# Patient Record
Sex: FEMALE | Race: ASIAN | NOT HISPANIC OR LATINO | Employment: OTHER
[De-identification: names, ages, dates, MRNs, and addresses within clinical notes are randomized per-mention and may not be internally consistent; named-entity substitution may affect disease eponyms.]

---

## 2019-09-10 LAB — PAP SMEAR - HIM PATIENT REPORTED: NORMAL

## 2020-01-17 ENCOUNTER — RECORDS - HEALTHEAST (OUTPATIENT)
Dept: ADMINISTRATIVE | Facility: OTHER | Age: 25
End: 2020-01-17

## 2020-01-20 ENCOUNTER — RECORDS - HEALTHEAST (OUTPATIENT)
Dept: ADMINISTRATIVE | Facility: OTHER | Age: 25
End: 2020-01-20

## 2020-01-20 ENCOUNTER — COMMUNICATION - HEALTHEAST (OUTPATIENT)
Dept: ADMINISTRATIVE | Facility: CLINIC | Age: 25
End: 2020-01-20

## 2020-01-23 ENCOUNTER — AMBULATORY - HEALTHEAST (OUTPATIENT)
Dept: EDUCATION SERVICES | Facility: CLINIC | Age: 25
End: 2020-01-23

## 2020-01-23 DIAGNOSIS — O24.419 GESTATIONAL DIABETES MELLITUS (GDM) IN THIRD TRIMESTER, GESTATIONAL DIABETES METHOD OF CONTROL UNSPECIFIED: ICD-10-CM

## 2020-01-23 ASSESSMENT — MIFFLIN-ST. JEOR: SCORE: 1483.85

## 2020-01-30 ENCOUNTER — AMBULATORY - HEALTHEAST (OUTPATIENT)
Dept: EDUCATION SERVICES | Facility: CLINIC | Age: 25
End: 2020-01-30

## 2020-01-30 DIAGNOSIS — O24.410 DIET CONTROLLED GESTATIONAL DIABETES MELLITUS (GDM) IN THIRD TRIMESTER: ICD-10-CM

## 2020-01-30 RX ORDER — SWAB
1 SWAB, NON-MEDICATED MISCELLANEOUS DAILY
Status: SHIPPED | COMMUNITY
Start: 2020-01-30 | End: 2021-07-12

## 2020-02-08 ENCOUNTER — COMMUNICATION - HEALTHEAST (OUTPATIENT)
Dept: SCHEDULING | Facility: CLINIC | Age: 25
End: 2020-02-08

## 2020-02-12 ENCOUNTER — COMMUNICATION - HEALTHEAST (OUTPATIENT)
Dept: EDUCATION SERVICES | Facility: CLINIC | Age: 25
End: 2020-02-12

## 2020-02-12 DIAGNOSIS — O24.419 GESTATIONAL DIABETES MELLITUS (GDM) IN THIRD TRIMESTER, GESTATIONAL DIABETES METHOD OF CONTROL UNSPECIFIED: ICD-10-CM

## 2020-02-13 ENCOUNTER — AMBULATORY - HEALTHEAST (OUTPATIENT)
Dept: EDUCATION SERVICES | Facility: CLINIC | Age: 25
End: 2020-02-13

## 2020-02-13 DIAGNOSIS — O24.419 GESTATIONAL DIABETES MELLITUS (GDM) IN THIRD TRIMESTER, GESTATIONAL DIABETES METHOD OF CONTROL UNSPECIFIED: ICD-10-CM

## 2020-02-20 ENCOUNTER — AMBULATORY - HEALTHEAST (OUTPATIENT)
Dept: EDUCATION SERVICES | Facility: CLINIC | Age: 25
End: 2020-02-20

## 2020-02-20 DIAGNOSIS — O24.419 GESTATIONAL DIABETES MELLITUS (GDM) IN THIRD TRIMESTER, GESTATIONAL DIABETES METHOD OF CONTROL UNSPECIFIED: ICD-10-CM

## 2020-03-05 ENCOUNTER — AMBULATORY - HEALTHEAST (OUTPATIENT)
Dept: EDUCATION SERVICES | Facility: CLINIC | Age: 25
End: 2020-03-05

## 2020-03-12 ENCOUNTER — COMMUNICATION - HEALTHEAST (OUTPATIENT)
Dept: EDUCATION SERVICES | Facility: CLINIC | Age: 25
End: 2020-03-12

## 2020-03-12 DIAGNOSIS — O24.419 GESTATIONAL DIABETES MELLITUS (GDM) IN THIRD TRIMESTER, GESTATIONAL DIABETES METHOD OF CONTROL UNSPECIFIED: ICD-10-CM

## 2020-03-13 ENCOUNTER — COMMUNICATION - HEALTHEAST (OUTPATIENT)
Dept: ADMINISTRATIVE | Facility: CLINIC | Age: 25
End: 2020-03-13

## 2020-03-17 ENCOUNTER — AMBULATORY - HEALTHEAST (OUTPATIENT)
Dept: EDUCATION SERVICES | Facility: CLINIC | Age: 25
End: 2020-03-17

## 2020-03-17 DIAGNOSIS — O24.419 GESTATIONAL DIABETES: ICD-10-CM

## 2020-03-18 ENCOUNTER — COMMUNICATION - HEALTHEAST (OUTPATIENT)
Dept: EDUCATION SERVICES | Facility: CLINIC | Age: 25
End: 2020-03-18

## 2020-03-18 ENCOUNTER — OFFICE VISIT - HEALTHEAST (OUTPATIENT)
Dept: EDUCATION SERVICES | Facility: CLINIC | Age: 25
End: 2020-03-18

## 2020-03-18 DIAGNOSIS — O24.410 DIET CONTROLLED GESTATIONAL DIABETES MELLITUS (GDM) IN THIRD TRIMESTER: ICD-10-CM

## 2020-03-23 ENCOUNTER — OFFICE VISIT - HEALTHEAST (OUTPATIENT)
Dept: EDUCATION SERVICES | Facility: CLINIC | Age: 25
End: 2020-03-23

## 2020-03-23 DIAGNOSIS — O24.410 DIET CONTROLLED GESTATIONAL DIABETES MELLITUS (GDM) IN THIRD TRIMESTER: ICD-10-CM

## 2020-03-25 ENCOUNTER — COMMUNICATION - HEALTHEAST (OUTPATIENT)
Dept: ADMINISTRATIVE | Facility: CLINIC | Age: 25
End: 2020-03-25

## 2020-03-25 ENCOUNTER — OFFICE VISIT - HEALTHEAST (OUTPATIENT)
Dept: EDUCATION SERVICES | Facility: CLINIC | Age: 25
End: 2020-03-25

## 2020-03-30 ENCOUNTER — HOSPITAL ENCOUNTER (OUTPATIENT)
Dept: OBGYN | Facility: CLINIC | Age: 25
Discharge: HOME | End: 2020-03-30

## 2020-03-31 ENCOUNTER — ANESTHESIA - HEALTHEAST (OUTPATIENT)
Dept: OBGYN | Facility: CLINIC | Age: 25
End: 2020-03-31

## 2020-04-02 ENCOUNTER — HOME CARE/HOSPICE - HEALTHEAST (OUTPATIENT)
Dept: HOME HEALTH SERVICES | Facility: HOME HEALTH | Age: 25
End: 2020-04-02

## 2020-04-03 ENCOUNTER — HOME CARE/HOSPICE - HEALTHEAST (OUTPATIENT)
Dept: HOME HEALTH SERVICES | Facility: HOME HEALTH | Age: 25
End: 2020-04-03

## 2020-04-03 ENCOUNTER — VIRTUAL VISIT (OUTPATIENT)
Dept: FAMILY MEDICINE | Facility: OTHER | Age: 25
End: 2020-04-03

## 2020-04-03 NOTE — PROGRESS NOTES
"Date: 2020 16:18:29  Clinician: Aramis Velez  Clinician NPI: [id number]  Patient: Melissa Balderrama  Patient : 1995  Patient Address: [address] [place] [address]  Patient Phone: [phone]  Visit Protocol: URI  Patient Summary:  Melissa is a 24 year old ( : 1995 ) female who initiated a Visit for COVID-19 (Coronavirus) evaluation and screening. When asked the question \"Please sign me up to receive news, health information and promotions. \", Melissa responded \"No\".    Melissa states her symptoms started today.   Melissa denies having diarrhea, vomiting, nausea, teeth pain, headache, fever, wheezing, sore throat, cough, nasal congestion, malaise, ear pain, enlarged lymph nodes, chills, rhinitis, facial pain or pressure, and myalgias. She also denies taking antibiotic medication for the symptoms and having recent facial or sinus surgery in the past 60 days. She is not experiencing dyspnea.    Pertinent COVID-19 (Coronavirus) information  Melissa has not traveled internationally or to the areas where COVID-19 (Coronavirus) is widespread, including cruise ship travel in the last 14 days before the start of her symptoms.   Melissa has not had a close contact with a laboratory-confirmed COVID-19 patient within 14 days of symptom onset. She also has not had a close contact with a suspected COVID-19 patient within 14 days of symptom onset.   Melissa does not work or volunteer as healthcare worker or a  and does not work or volunteer in a healthcare facility. She does not live with a healthcare worker.   Triage Point(s) temporarily suspended for COVID-19 (Coronavirus) screening  Melissa reported the following symptoms which were previously protocol referral points. These protocol referral points have temporarily been removed for purposes of COVID-19 (Coronavirus) screening.   Denied all URI symptoms   Pertinent medical history  Melissa does not get yeast infections when she takes " antibiotics.   Melissa does not need a return to work/school note.   Weight: 172 lbs   Melissa does not smoke or use smokeless tobacco.   She denies pregnancy and is breastfeeding. She is currently menstruating.   Weight: 172 lbs    MEDICATIONS: Vitamin D3 oral, Prenatal Vitamin oral, ibuprofen oral, ALLERGIES: NKDA  Clinician Response:  Dear Melissa,  I am sorry you are not feeling well. Your health is our priority. Based on the information you have provided, it is possible that you may have some type of viral infection.  Please read the full treatment plan and see my recommendations below.  Medication information  Because you have a viral infection, antibiotics will not help you get better. Treating a viral infection with antibiotics could actually make you feel worse.  Self care  Steps you can take to be as comfortable as possible:     Rest.    Drink plenty of fluids.     COVID-19 (Coronavirus) General Information  With the increase in the number of COVID-19 (Coronavirus) cases, we understand you may have some questions. Below is some helpful information on COVID-19 (Coronavirus).  How can I protect myself and others from the COVID-19 (Coronavirus)?  Because there is currently no vaccine to prevent infection, the best way to protect yourself is to avoid being exposed to this virus. Put distance between yourself and other people if COVID-19 (Coronavirus) is spreading in your community. The virus is thought to spread mainly from person-to-person.     Between people who are in close contact with one another (within about 6 about) for a prolonged period (10 minutes or longer).    Through respiratory droplets produced when an infected person coughs or sneezes.     The CDC recommends the following additional steps to protect yourself and others:     Wash your hands often with soap and water for at least 20 seconds, especially after blowing your nose, coughing, or sneezing; going to the bathroom; and before eating or  preparing food.  Use an alcohol-based hand  that contains at least 60 percent alcohol if soap and water are not available.        Avoid touching your eyes, nose and mouth with unwashed hands.    Avoid close contact with people who are sick.    Stay home when you are sick.    Cover your cough or sneeze with a tissue, then throw the tissue in the trash.    Clean and disinfect frequently touched objects and surfaces.     You can help stop COVID-19 (Coronavirus) by knowing the signs and symptoms:     Fever    Cough    Shortness of breath     Contact your healthcare provider if   Develop symptoms   AND   Have been in close contact with a person known to have COVID-19 (Coronavirus) or live in or have recently traveled from an area with ongoing spread of COVID-19 (Coronavirus). Call ahead before you go to a doctor's office or emergency room. Tell them about your recent travel and your symptoms.   For the most up to date information, visit the CDC's website.  Self-monitoring  Self-monitoring means people should monitor themselves for fever by taking their temperatures twice a day and remain alert for a cough or difficulty breathing.  It is important to check your health two times each day for 14 days after a potential exposure to a person with COVID-19 (Coronavirus) or after travel from a location where COVID-19 (Coronavirus) is widespread. If you have been exposed to a person with COVID-19 (Coronavirus), it may take up to 14 days to know if you will get sick. Follow the steps below to check and record your health.     Take your temperature with a thermometer twice a day, once in the morning and once in the evening, and watch for a cough or difficulty breathing for 14 days.    Write down your temperature and any COVID-19 symptoms you may have: feeling feverish, coughing, or difficulty breathing.    Stay home from work or school.    Do not take public transportation, taxis, or ride-shares.    Avoid crowded places  (such as shopping centers and movie theaters) and limit your activities in public.    Keep your distance from others (about 6 feet or 2 meters).    If you get sick with fever, cough, or trouble breathing, contact your healthcare provider and tell them about your recent travel and/or your symptoms.    If you need to seek medical care for other reasons, such as dialysis, call ahead to your doctor and tell them about your recent travel.     Steps to help prevent the spread of COVID-19 (Coronavirus) if you are sick  If you are sick with COVID-19 (Coronavirus) or suspect you are infected with the virus that causes COVID-19 (Coronavirus), follow the steps below to help prevent the disease from spreading&nbsp;to people in your home and community.     Stay home except to get medical care. Home isolation may be started in consultation with your healthcare clinician.    Separate yourself from other people and animals in your home.    Call ahead before visiting your doctor if you have a medical appointment.    Wear a facemask when you are around other people.    Cover your cough and sneezes.    Clean your hands often.    Avoid sharing personal household items.    Clean and disinfect frequently touched objects and surfaces everyday.    You will need to have someone drop off medications or household supplies (if needed) at your house without coming inside or in contact with you or others living in your house.    Monitor your symptoms and seek prompt medical care if your illness is worsening (e.g. Difficulty breathing).    Discontinue home isolation only in consultation with your healthcare provider.     For more detailed and up to date information on what to do if you are sick, visit this link: What to Do If You Are Sick With COVID-19.  Do I need to be tested for COVID-19 (Coronavirus)?     Not everyone needs to be tested for COVID-19 (Coronavirus). Decisions on which patients receive testing will be based on the local spread of  "COVID-19 (Coronavirus) as well as the symptoms. Your healthcare provider will make the final decision on whether you should be tested.    In the meantime, if you have concerns that you may have been exposed, it is reasonable to practice \"social distancing.\"&nbsp; If you are ill with a cold or flu-like illness, please monitor your symptoms and call your healthcare provider if your symptoms worsen.    For more up to date information, visit this link: COVID-19 (Coronavirus) Frequently Asked Questions and Answers.      Diagnosis: COVID-19 (Coronavirus) concern  Diagnosis ICD: Z03.818  "

## 2020-04-10 ENCOUNTER — AMBULATORY - HEALTHEAST (OUTPATIENT)
Dept: PEDIATRICS | Facility: CLINIC | Age: 25
End: 2020-04-10

## 2020-05-11 ENCOUNTER — RECORDS - HEALTHEAST (OUTPATIENT)
Dept: ADMINISTRATIVE | Facility: OTHER | Age: 25
End: 2020-05-11

## 2020-05-12 ENCOUNTER — COMMUNICATION - HEALTHEAST (OUTPATIENT)
Dept: ADMINISTRATIVE | Facility: CLINIC | Age: 25
End: 2020-05-12

## 2020-05-19 ENCOUNTER — RECORDS - HEALTHEAST (OUTPATIENT)
Dept: HEALTH INFORMATION MANAGEMENT | Facility: CLINIC | Age: 25
End: 2020-05-19

## 2020-05-29 ENCOUNTER — OFFICE VISIT - HEALTHEAST (OUTPATIENT)
Dept: FAMILY MEDICINE | Facility: CLINIC | Age: 25
End: 2020-05-29

## 2020-05-29 ENCOUNTER — COMMUNICATION - HEALTHEAST (OUTPATIENT)
Dept: TELEHEALTH | Facility: CLINIC | Age: 25
End: 2020-05-29

## 2020-05-29 DIAGNOSIS — R73.9 HYPERGLYCEMIA: ICD-10-CM

## 2020-05-29 RX ORDER — GLUCOSAMINE HCL/CHONDROITIN SU 500-400 MG
1 CAPSULE ORAL PRN
Qty: 100 STRIP | Refills: 3 | Status: SHIPPED | OUTPATIENT
Start: 2020-05-29 | End: 2021-08-10

## 2020-07-10 ENCOUNTER — AMBULATORY - HEALTHEAST (OUTPATIENT)
Dept: LAB | Facility: CLINIC | Age: 25
End: 2020-07-10

## 2020-07-10 DIAGNOSIS — R73.9 HYPERGLYCEMIA: ICD-10-CM

## 2020-07-10 LAB — HBA1C MFR BLD: 5.5 % (ref 3.5–6)

## 2020-08-27 ENCOUNTER — APPOINTMENT (OUTPATIENT)
Dept: URBAN - METROPOLITAN AREA CLINIC 260 | Age: 25
Setting detail: DERMATOLOGY
End: 2020-08-28

## 2020-08-27 VITALS — WEIGHT: 165 LBS | HEIGHT: 65 IN

## 2020-08-27 DIAGNOSIS — L83 ACANTHOSIS NIGRICANS: ICD-10-CM

## 2020-08-27 PROCEDURE — OTHER PRESCRIPTION: OTHER

## 2020-08-27 PROCEDURE — 99202 OFFICE O/P NEW SF 15 MIN: CPT

## 2020-08-27 PROCEDURE — OTHER COUNSELING: OTHER

## 2020-08-27 RX ORDER — HYDROQUINONE 4 %
CREAM (GRAM) TOPICAL
Qty: 1 | Refills: 1 | Status: ERX | COMMUNITY
Start: 2020-08-27

## 2020-08-27 ASSESSMENT — LOCATION DETAILED DESCRIPTION DERM
LOCATION DETAILED: LEFT AXILLARY VAULT
LOCATION DETAILED: RIGHT AXILLARY VAULT
LOCATION DETAILED: MID POSTERIOR NECK

## 2020-08-27 ASSESSMENT — LOCATION ZONE DERM
LOCATION ZONE: AXILLAE
LOCATION ZONE: NECK

## 2020-08-27 ASSESSMENT — LOCATION SIMPLE DESCRIPTION DERM
LOCATION SIMPLE: POSTERIOR NECK
LOCATION SIMPLE: LEFT AXILLARY VAULT
LOCATION SIMPLE: RIGHT AXILLARY VAULT

## 2020-08-27 ASSESSMENT — BSA RASH: BSA RASH: 5

## 2020-08-27 NOTE — HPI: RASH
How Severe Is Your Rash?: moderate
Is This A New Presentation, Or A Follow-Up?: Rash
Additional History: Had gestational diabetes during her pregnancy \\nMom and dad have diabetes\\nThis started before her pregnancy.\\nShe does have a blood glucose monitor

## 2020-08-27 NOTE — PROCEDURE: COUNSELING
Detail Level: Zone
Patient Specific Counseling (Will Not Stick From Patient To Patient): Hold off on Hydroquinone until she is no longer breastfeeding \\nRecommend she try niacinamide, aha, bha, Kojic acid for her skin to help lighten the area\\nRecommended weight loss, which will come with breastfeeding, healthy diet and follow up with her pcp to make sure her glucose levels are appropriate \\nLikely hereditary

## 2020-09-28 ENCOUNTER — OFFICE VISIT - HEALTHEAST (OUTPATIENT)
Dept: FAMILY MEDICINE | Facility: CLINIC | Age: 25
End: 2020-09-28

## 2020-09-28 DIAGNOSIS — Z00.00 ANNUAL PHYSICAL EXAM: ICD-10-CM

## 2020-09-28 DIAGNOSIS — J45.909 REACTIVE AIRWAY DISEASE WITHOUT COMPLICATION, UNSPECIFIED ASTHMA SEVERITY, UNSPECIFIED WHETHER PERSISTENT: ICD-10-CM

## 2020-09-28 DIAGNOSIS — Z00.00 HEALTHCARE MAINTENANCE: ICD-10-CM

## 2020-09-28 DIAGNOSIS — Z86.32 HISTORY OF GESTATIONAL DIABETES: ICD-10-CM

## 2020-09-28 ASSESSMENT — MIFFLIN-ST. JEOR: SCORE: 1454.36

## 2021-05-27 VITALS
OXYGEN SATURATION: 98 % | DIASTOLIC BLOOD PRESSURE: 56 MMHG | TEMPERATURE: 99 F | RESPIRATION RATE: 18 BRPM | SYSTOLIC BLOOD PRESSURE: 98 MMHG | HEART RATE: 96 BPM

## 2021-06-04 VITALS
SYSTOLIC BLOOD PRESSURE: 99 MMHG | BODY MASS INDEX: 26.29 KG/M2 | OXYGEN SATURATION: 98 % | WEIGHT: 158 LBS | DIASTOLIC BLOOD PRESSURE: 63 MMHG | HEART RATE: 78 BPM | TEMPERATURE: 98.9 F

## 2021-06-04 VITALS — WEIGHT: 165.8 LBS | BODY MASS INDEX: 27.59 KG/M2

## 2021-06-04 VITALS — BODY MASS INDEX: 28.36 KG/M2 | WEIGHT: 170.4 LBS

## 2021-06-04 VITALS — BODY MASS INDEX: 27.66 KG/M2 | HEIGHT: 65 IN | WEIGHT: 166 LBS

## 2021-06-04 VITALS — BODY MASS INDEX: 28.32 KG/M2 | WEIGHT: 170.2 LBS

## 2021-06-04 VITALS — BODY MASS INDEX: 28.04 KG/M2 | WEIGHT: 168.5 LBS

## 2021-06-05 VITALS
WEIGHT: 156 LBS | HEIGHT: 66 IN | SYSTOLIC BLOOD PRESSURE: 96 MMHG | DIASTOLIC BLOOD PRESSURE: 52 MMHG | HEART RATE: 76 BPM | OXYGEN SATURATION: 98 % | BODY MASS INDEX: 25.07 KG/M2

## 2021-06-05 NOTE — TELEPHONE ENCOUNTER
01.20- lm w/ referring office to fax lab results.     Please watch for results and call the patient once we have the records.

## 2021-06-05 NOTE — TELEPHONE ENCOUNTER
RN Triage:     Wife is pregnant. Seeing DE with HE, wife has gestation diabetes.   Not taking insulin or medication. Using diet modifications. They were told to call DE if sugars  Fluctuations in blood sugars.   Fasting sugars 93 in morning.   1 Hour after breakfast 159   Breakfast was pongal (made of millet cooked with water and coconut chutney which is homemade)   Fasting in the morning 117 it was high only one day per .   Dly (Ionized cream) and fish gee  One hour later she checked her blood sugar and it was 150     This morning BS was 93        is concerned about high blood sugars and calling into triage today. Advised that the DE office is closed. If he is concerned about anything more he should call the on call OB with his clinic.     Patient has an appointment next Thursday with DE.     Will forward the message onto the DE staff at the [facility] clinic.     Grisel Saucedo RN, BSN Care Connection Triage Nurse                Reason for Disposition    [1] Caller requesting NON-URGENT health information AND [2] PCP's office is the best resource    Protocols used: INFORMATION ONLY CALL-A-

## 2021-06-05 NOTE — PROGRESS NOTES
[facility] Gestational Diabetes Care Plan    Assessment: Melissa is here with her  today for follow-up on Gestational Diabetes.  She is doing well.  Stated she does not feel hungry in between her meals and is getting enough to eat.   stated they have switched to brown rice and eating less dosa at meals.  Her higher readings are noted below:    F-107/95-first 2 days, after all under 95  D-143-birthday party  Ketones-negative  Discuss need to continue to monitor and call if 3 high readings in a week.  She is walking for 20 minutes after dinner each night.    All additional questions and concerns addressed today.    Plan: Patient will follow-up in clinic in 2 weeks.  She will continue to monitor her ketones and blood sugars as discussed.  Discussed she should call before appointment if she has 3 high fasting readings in 7 days, 3 high unexplainable post meal readings in 7 days or 2 days of large/moderate ketones.  Patient agreed with plan.      Provider: Nancy Burgess  Provider's Diagnosis (per referral form) Gestational Diabetes (648.83)    Weight: 165 lb 12.8 oz (75.2 kg)  OGTT: 113/231/--/--  EDC: Estimated Date of Delivery: 4/5/20, having a boy  Pregnancy number: 1  Previous GDM: No    Medications:   Current Outpatient Medications:      acetone, urine, test Strp, Check urine for ketones once per day in the morning., Disp: 50 strip, Rfl: 1     blood glucose test strips, One Touch Verio test strips. Check blood sugar 4 times per day., Disp: 125 strip, Rfl: 0     ONETOUCH DELICA LANCETS 30 gauge Misc, Check blood sugar 4 times per day., Disp: 200 each, Rfl: 1     prenatal vitamin iron-folic acid 27mg-0.8mg (PRENATAL S) 27 mg iron- 800 mcg Tab tablet, Take 1 tablet by mouth daily., Disp: , Rfl:   PNV: Yes  Supplements: No    BG monitoring goals: Fasting <95; 1 hour post start of meal <140. Test 4 x per day.  Check fasting a.m. ketones: Yes  GDM meal pattern/carb counting taught per guidelines:  Yes    Time: 30 minutes DSMT  Visit Type: GDM Individual Follow-up  Visit #: 2      Suzy Mcqueen  1/30/2020    DIABETES CARE PLAN AND EDUCATION RECORD    Gestational Diabetes Disease Process/Preconception Care/Management During Pregnancy/Postpartum:Assessed and Discussed    Meter (per above goals): Assessed and Discussed    Nutrition Management    Weight: Assessed and Discussed  Portions/Balance: Assessed and discussed  Carb ID/Count: Assessed and discussed  Label Reading: Assessed and discussed  Menu Planning: Assessed and Discussed  Dining Out: Assessed and Discussed  Physical Activity: Assessed and Discussed  Medications: Assessed and Discussed    Acute Complications: Prevent, Detect, Treat:      Hyperglycemia: Assessed and Discussed  Goal Setting and Problem Solving: Assessed and Discussed  Barriers: Assessed and Discussed  Psychosocial Adjustments: Assessed and Discussed    I agree with the aforementioned diabetes plan.  [facility]ina [facility]  1/30/2020  10:18 AM

## 2021-06-05 NOTE — TELEPHONE ENCOUNTER
Date: 1/23/2020 Status: Aneesh   Time: 3:00 PM Length: 60   Visit Type: CONSULT [[id number]] Copay: $0.00   Provider: Aislinn De Leon RD, CDE

## 2021-06-05 NOTE — TELEPHONE ENCOUNTER
External - Latanya   Referring Provider: Dr. Burgess   DX: Gestational Diabetes    Referral received with no records on 01.17.2020 @ 10:38 am    *Please call referring for lab results.

## 2021-06-05 NOTE — TELEPHONE ENCOUNTER
Spoke with pt. She reports only the one high FBG at 116. Reports fluctuations w/ pp readings, some >140. Reviewed watching cho portions with meals, w/in the GDM meal plan and walking for 10-15 mins after meals if she is able. She verbalized understanding and has no other questions/concerns. Pt will f/u with Trudi on Thursday as scheduled and will review everything at that time.

## 2021-06-05 NOTE — PROGRESS NOTES
Gestational Diabetes Care Plan    Assessment: Melissa is here today for initial education regarding gestational diabetes and management. Instructed on what is gestational diabetes and how to manage it. Demonstrated how to check blood sugar at home. Discussed what are ketones and how to check for them in the morning. Instructed on carbohydrate counting, meal planning and label reading. Provided a sample menu and carb list for [place]  food.  Tacos attended session today and is supportive.    B (1 hour after lunch that included Basmati rice)    Plan: Check blood glucose 4 times daily, check ketones each morning before breakfast  and follow dietary guidelines as recommended during today's encounter. Continue to take all medications as currently prescribed and include physical activity as often as possible and able. Bring meter, blood sugar log and food record to next visit in one week.    Provider: [facility]  Provider's Diagnosis (per referral form): Diet controlled gestational diabetes in third trimester (024.410)    Weight: 166 lb (75.3 kg)    Glucose screen 187(2020)  OGTT: YGH=225 1 hour=231 (2 and 3 hour not done)    EDC: Estimated Date of Delivery: 20 29w4d  Pregnancy #: 1 (KNOW BABY IS A BOY)  Previous GDM: No   Melissa does not work    Medications:   Current Outpatient Medications:      acetone, urine, test Strp, Check urine for ketones once per day in the morning., Disp: 50 strip, Rfl: 1     blood glucose test strips, One Touch Verio test strips. Check blood sugar 4 times per day., Disp: 125 strip, Rfl: 0     ONETOUCH DELICA LANCETS 30 gauge Misc, Check blood sugar 4 times per day., Disp: 200 each, Rfl: 1    Meter: One Touch Verio Flex ([employer] insurance)    BG monitoring goals: Fasting <95; 1 hour post start of meal <140. Test 4 x per day.  Check fasting a.m. ketones: Yes  GDM meal pattern/carb counting taught per  guidelines: Yes  Goals: Nutrition: GDM meal plan  Activity:  Walking after meals when able/staying active  Monitoring: Check BG 4x/day (before breakfast and 1 hour after each meal)  Check urine ketones once daily every morning    DIABETES CARE PLAN AND EDUCATION RECORD    Gestational Diabetes Disease Process/Preconception Care/Management During Pregnancy/Postpartum: [place] GDM book, [facility] placemat given    Meter (per above goals): Discussed, Literature provided and Patient returned demonstration    Nutrition Management   [place] 2 day sample GDM menu: Discussed and Literature provided. From the [place] Intranet GDM Resources on the [employer]  [place]  Carbohydrate list: Discussed and Literature provided ([facility]- also on the drive)    Weight: Assessed, Discussed and Literature provided  Portions/Balance: Assessed, Discussed and Literature provided  Carb ID/Count: Assessed, Discussed and Literature provided.   Label Reading: Assessed, Discussed and Literature provided    Physical Activity: Discussed and Literature provided    Acute Complications: Prevent, Detect, Treat:    Goal Setting and Problem Solving: Assessed and Discussed  Barriers: Assessed   Psychosocial Adjustments: Assessed     Time: 60 Minutes  Visit Type: Diabetes Self-Management Training ()    Aislinn De Leon, RD, LD, CDE  Diabetes Educator  1/23/2020     Copy of note faxed to Dr. Nancy Burgess  Fax# [phone]

## 2021-06-05 NOTE — PATIENT INSTRUCTIONS - HE
1. Check urine for ketones in the morning. Your goal is negative or trace ketones. If you have ketones in your urine it means you are not eating enough before you go to bed. Eat a larger bedtime snack and include protein. Remember that ketones are not good for your baby. Drinking water during the day helps to dilute ketones.    2. Test blood sugar 4 times per day. Before breakfast and 1 hour after meals.        Blood sugar goals:       Before breakfast: less 95      1 hour after meals: less 140      2 hours after meals: less 120    3. Eat 3 meals and 3 snacks per day according to the meal plan.   Breakfast: 30-45 grams of carbohydrate with protein   Lunch and dinner: 45-60 grams of carbohydrate  Snacks: 15-30 grams of carbohydrate with protein    4. Avoid fruit, juice and cereal at breakfast. These foods tend to cause high blood sugar.    5. Include high fiber, whole grain foods instead of processed white food. Healthier choices are whole grain bread, pasta and brown rice or wild rice.     6. Avoid high sugar, low nutrient foods like sugary drinks, candy chocolate, syrup, chips and desserts.    7. Staying active after meals helps to decrease blood sugar.    8. Keep a detailed food and blood sugar record and note ketone levels. Bring meter and food records to next visit in 1 week.

## 2021-06-06 NOTE — PROGRESS NOTES
Spoke with Mahad Balderrama  Melissa's spouse,  she was with him at home.      Phone message from 3/17/20:     Reporting elevated FBS:  97,98, 96,97, 110, 109, 106.   Post meals are WNL.     Due 4/420.   Following protocol to start 8 units Levemir insulin at bedtime.   Patient will  prescription tonight,  I will email instruction sheet and will talk by phone tomorrow to review instructions.           Today:   He did not  prescription yet but  did receive the instructions for insulin pen injection via email and was following along.     He did verbalized understanding and repeated instruction to start 8 units Levemir at bedtime:  11 pm.   Reviewed signs/symptoms hypoglycemia.  He plans to get precription from pharmacy today.  I instructed him to do so before noon today and if he has any questions once he has the product to call us back today.     I will call him/spouse on 3/22/20, he is to call before then if any questions.       phone time set at [facility] clinic 3/25/20     30 minutes spent      I agree with the aforementioned diabetes plan.  Adelina LO [facility] Endocrinology  3/18/2020  9:58 AM

## 2021-06-06 NOTE — PROGRESS NOTES
[facility] Gestational Diabetes Care Plan    Assessment: Melissa is here with her  for follow-up on Gestational Diabetes.  They did call over the weekend regarding higher readings.  Since then she has continued to struggle with some higher readings, which are noted below:  F-102  B-150/165/157/159  L-160  D-157/154  Ketones-negative  She is eating a potato gee for breakfast.  Suggested she try adding some protein and see if this improves her readings.  Hesitant to start insulin as she does have some low post prandial readings as well, 111 and 116.    All additional questions and concerns addressed today.    Plan: Melissa will follow-up in 1 week.  Discussed if after meal readings do not improve may need to start insulin next week.    Provider: Nancy Burgess  Provider's Diagnosis (per referral form) Gestational Diabetes (648.83)    Weight: 168 lb 8 oz (76.4 kg)  OGTT: 113/231/--/--  EDC: Estimated Date of Delivery: 4/5/20  Pregnancy number: 1  Previous GDM: No    Medications:   Current Outpatient Medications:      acetone, urine, test Strp, Check urine for ketones once per day in the morning., Disp: 50 strip, Rfl: 1     blood glucose test (ONETOUCH VERIO) strips, TEST BLOOD SUGAR 4 TIMES DAILY, Disp: 100 strip, Rfl: 0     ONETOUCH DELICA LANCETS 30 gauge Misc, Check blood sugar 4 times per day., Disp: 200 each, Rfl: 1     prenatal vitamin iron-folic acid 27mg-0.8mg (PRENATAL S) 27 mg iron- 800 mcg Tab tablet, Take 1 tablet by mouth daily., Disp: , Rfl:   PNV: Yes  Supplements: No    BG monitoring goals: Fasting <95; 1 hour post start of meal <140. Test 4 x per day.  Check fasting a.m. ketones: Yes  GDM meal pattern/carb counting taught per guidelines: Yes    Time: 30 minutes DSMT  Visit Type: GDM Individual Follow-up  Visit #: 3      Suzy Mcqueen  2/13/2020    DIABETES CARE PLAN AND EDUCATION RECORD    Gestational Diabetes Disease Process/Preconception Care/Management During  Pregnancy/Postpartum:Assessed and Discussed    Meter (per above goals): Assessed and Discussed    Nutrition Management    Weight: Assessed and Discussed  Portions/Balance: Assessed and discussed  Carb ID/Count: Assessed and discussed  Label Reading: Assessed and discussed  Menu Planning: Assessed and Discussed  Dining Out: Assessed and Discussed  Physical Activity: Assessed and Discussed  Medications: Assessed and Discussed    Acute Complications: Prevent, Detect, Treat:      Hyperglycemia: Assessed and Discussed  Goal Setting and Problem Solving: Assessed and Discussed  Barriers: Assessed and Discussed  Psychosocial Adjustments: Assessed and Discussed      I agree with the aforementioned diabetes plan.  Adelina LO [facility] Endocrinology  2/14/2020  6:08 AM

## 2021-06-06 NOTE — TELEPHONE ENCOUNTER
Spoke w/ pt and . Reports the last 4 FBG have been >95. Last night pt had juice at HS. Discussed d/c juice and adding a protein at HS. Pt is due to deliver on 4/4/20, she does not have f/u scheduled as she is close.   Pp readings are WNL. Discussed trying this over the weekend and to call back on Monday if FBG are still >95.

## 2021-06-06 NOTE — PROGRESS NOTES
[facility] Gestational Diabetes Care Plan    Assessment: Melissa and her  are here for their follow-up on Gestational Diabetes.  Stated she is feeling well.  She is no longer hungry between her meals and snacks.  She did have higher post prandial readings last week, these have improved with adjustments to her meal plan.  Few higher readings she does have this week are noted below:    F-104  B-149  D-155/152  Ketones-negative  Discussed continuing the watch her glucose and carb intake, things look much better this week.    All additional questions and concerns addressed today.    Plan: Patient will follow-up in clinic in 2 weeks.  She will continue to monitor her ketones and blood sugars as discussed.  Discussed she should call before appointment if she has 3 high fasting readings in 7 days, 3 high unexplainable post meal readings in 7 days or 2 days of large/moderate ketones.  Patient agreed with plan.      Provider: Dr. Burgess  Provider's Diagnosis (per referral form) Gestational Diabetes (648.83)    Weight: 170 lb 6.4 oz (77.3 kg)  OGTT: 113/231/--/--  EDC: Estimated Date of Delivery: 4/5/20  Pregnancy number: 1  Previous GDM: No    Medications:   Current Outpatient Medications:      acetone, urine, test Strp, Check urine for ketones once per day in the morning., Disp: 50 strip, Rfl: 1     blood glucose test (ONETOUCH VERIO) strips, TEST BLOOD SUGAR 4 TIMES DAILY, Disp: 100 strip, Rfl: 0     ONETOUCH DELICA LANCETS 30 gauge Misc, Check blood sugar 4 times per day., Disp: 200 each, Rfl: 1     prenatal vitamin iron-folic acid 27mg-0.8mg (PRENATAL S) 27 mg iron- 800 mcg Tab tablet, Take 1 tablet by mouth daily., Disp: , Rfl:   PNV: Yes  Supplements: No    BG monitoring goals: Fasting <95; 1 hour post start of meal <140. Test 4 x per day.  Check fasting a.m. ketones: Yes  GDM meal pattern/carb counting taught per guidelines: Yes    Time: 30 minutes DSMT  Visit Type: GDM Individual Follow-up  Visit #:  4      Suzy Mcqueen  2/20/2020    DIABETES CARE PLAN AND EDUCATION RECORD    Gestational Diabetes Disease Process/Preconception Care/Management During Pregnancy/Postpartum:Assessed and Discussed    Meter (per above goals): Assessed and Discussed    Nutrition Management    Weight: Assessed and Discussed  Portions/Balance: Assessed and discussed  Carb ID/Count: Assessed and discussed  Label Reading: Assessed and discussed  Menu Planning: Assessed and Discussed  Dining Out: Assessed and Discussed  Physical Activity: Assessed and Discussed  Medications: Assessed and Discussed    Acute Complications: Prevent, Detect, Treat:      Hyperglycemia: Assessed and Discussed  Goal Setting and Problem Solving: Assessed and Discussed  Barriers: Assessed and Discussed  Psychosocial Adjustments: Assessed and Discussed    I agree with the aforementioned diabetes plan.  Adelina LO [facility] Endocrinology  2/20/2020  4:02 PM

## 2021-06-06 NOTE — TELEPHONE ENCOUNTER
Spoke with Mahad Balderrama  Melissa's spouse,  she was with him at home.     Phone message from 3/17/20:    Reporting elevated FBS:  97,98, 96,97, 110, 109, 106.   Post meals are WNL.     Due 4/420.   Following protocol to start 8 units Levemir insulin at bedtime.   Patient will  prescription tonight,  I will email instruction sheet and will talk by phone tomorrow to review instructions.         Today:   He did not  prescription yet but  did receive the instructions for insulin pen injection via email and was following along.     He did verbalized understanding and repeated instruction to start 8 units Levemir at bedtime:  11 pm.   Reviewed signs/symptoms hypoglycemia.  He plans to get precription from pharmacy today.  I instructed him to do so before noon today and if he has any questions once he has the product to call us back today.    I will call him/spouse on 3/22/20, he is to call before then if any questions.      phone time set at [facility] clinic 3/25/20    30 minutes spent     I agree with the aforementioned diabetes plan.  Adelina LO [facility] Endocrinology  3/18/2020  9:58 AM

## 2021-06-06 NOTE — TELEPHONE ENCOUNTER
Spouse called    Patient is still having high fasting numbers. Fasting for yesterday was  109 and today was 106    Please advise on what to do.     Tacos @ [phone]

## 2021-06-07 NOTE — PROGRESS NOTES
"Anahi Balderrama is a 10 days male who is being evaluated via a billable video visit.       The patient has been notified of following:      \"This video visit will be conducted via a call between you and your physician/provider. We have found that certain health care needs can be provided without the need for an in-person physical exam.  This service lets us provide the care you need with a video conversation.  If a prescription is necessary we can send it directly to your pharmacy.  If lab work is needed we can place an order for that and you can then stop by our lab to have the test done at a later time.     Video visits are billed at different rates depending on your insurance coverage. Please reach out to your insurance provider with any questions.     If during the course of the call the physician/provider feels a video visit is not appropriate, you will not be charged for this service.\"     Patient has given verbal consent to a Video visit? Yes     Patient would like to receive their AVS by AVS Preference: Mail a copy.     Patient would like the video invitation sent by: Text to cell phone: [phone]       Video Start Time: 3:01 PM     Anahi Balderrama complains of        Chief Complaint   Patient presents with     Lactation Services        I have reviewed and updated the patient's Past Medical History, Social History, Family History and Medication List.     ALLERGIES  Patient has no known allergies.     Additional provider notes:         [facility] Lactation Phone Encounter      SUBJECTIVE:      Anahi is here over video chat today along with mom and dad, for lactation support. He is a 10 days male born at Gestational Age: 39w3d now 10 days.    He is doing well. He has gained 3 oz since last visit 4 days ago. He has gained approximately 0.75 oz per day over the past 4 days and is now 5% from birth weight.      He is having difficulty latching to the R side. Mom has severe pain after nursing on the R " side. She is pumping as well, the pain is after emptying the breast and not during feeding. The pain has been getting worse over time. No intense breast pain while feeding on the R side; the pain develops after emptying the breast. Mom has been hand expressing as this has been more effective than pumping; mom can generally remove about 5 - 10 mls of breast milk with a breast pump, but up to 30 mls with hand expression.       No fevers. Breast does not feel hot or warm. He is also not latching properly from the R side and sometimes does not latch at all to this side.       Mom is using a nipple shield intermittently on the L side but he can sometimes latch without it. Mom always uses the shield on the R side as he has difficulty latching without it.         Baby is nursing every 2.5 - 3 hours during the day and about 3.5 - 4 hours at night. Nursing sessions are typically 45 minutes or longer because he falls asleep frequently and it is challenging to keep him active with nursing. Sometimes overnight he takes a bottle mom does not latch him.      Mother reports hearing audible swallows on both sides.   Baby feeds about 8 times in 24 hours.      Baby is supplemented with expressed breast milk or formula, about 40 ml after feeds (approximately 8-9 times per day).   Mom is also pumping or hand expressing about 3 times per day and gets about 30 ml per hand expression session. Mom often gets more milk when she does hand expression than when pumping (only about 5 - 10 mls when pumping)       Mom has been using warm compresses and massage before and during nursing or pumping and then ice after. The ice has not been helpful. Mom has been having trouble sleeping due to the pain. About 1 - 1.5 hours after doing breast massage/ hand expression the pain subsides.          Breastfeeding Goals: No pain, better latching     Previous Breastfeeding Experience: None  Breast-surgery:  none  Maternal medications: Looking for sunflower  lecithin, plans to start soon. Taking ibuprofen for pain.               Results for orders placed or performed in visit on 20   Bilirubin,  Total   Result Value Ref Range     Bilirubin, Total 12.0 (H) 0.0 - 7.0 mg/dL     Age in Hours 64 hours       Current Outpatient Medications:      nystatin (MYCOSTATIN) ointment, Apply topically 3 (three) times a day., Disp: 30 g, Rfl: 1       Past Medical History:   Diagnosis Date      of mother with diabetes mellitus 2020     No past surgical history on file.  No family history on file.        Primary care provider: Tom Shah MD     OBJECTIVE:     Mother:   Nipples are everted, the areola is compressible, the breast is soft and full.      Sore nipples: some soreness with latching on the R side, no bruises, cracking or bleeding.      Age today: 10 days     There were no vitals filed for this visit.        Weight:       Wt Readings from Last 3 Encounters:   04/10/20 7 lb 11.5 oz (3.501 kg) (34 %, Z= -0.42)*   20 7 lb 8.5 oz (3.416 kg) (38 %, Z= -0.30)*   20 7 lb 2 oz (3.232 kg) (32 %, Z= -0.46)*      * Growth percentiles are based on WHO (Boys, 0-2 years) data.        Birthweight:  7 lb 6 oz (3.345 kg).   Today's weight:    Vitals   There were no vitals filed for this visit.   . This is 5% from birth weight.      Feeding assessment:      Baby attempted breast feeding over video call but was not showing feeding cues and latched quite shallowly very briefly before this attempt was abandoned. Latching technique to ensure as deep a latch as possible was then demonstrated with props over video call.         Assessment:     1.  difficulty in feeding at breast        Anahi has been growing appropriately. It is unclear how much he is transferring at the breast, but mom is hearing him swallow when nursing. This is going better over all on the L side than the R side. Mom has severe breast pain on the R side after emptying that side,  "which I believe is most likely due to inadequate emptying of that side. Anahi is latching better to the L side and is emptying that breast more thoroughly. On the R side he is having more difficulty latching, and the milk is not draining effectively from that side. Mom does not respond well to her breast pump and this has not been an effective way to empty her breast. She is hand expressing which is working better, but is likely not enough to fully empty that side. The combination of milk clogged in her ducts on the R side + the amount of manipulation by hand expression she is needing to do on that side is likely the cause of her pain. She is at risk of mastitis due to these factors - discussed signs and symptoms to watch out for and when to call her PCP.      Plan:     Continue to breastfeed on demand, at least 8-12 times a day.      Usually you would alternate which side you start on. For the next 2 feedings, start on the R side so that he is most hungry for this side. You can attempt to latch him without the nipple shield to start, but use this as needed. Latch him as deeply to this side as possible. It may take several tries. Make a \"breast sandwich,\" and aim your nipple for the roof of the mouth. If baby's lips are rolled inward, flip the top lip out with your finger, and then apply gentle downward pressure to the chin to help the lips flange out like \"fish lips.\" If you have pain that lasts beyond the initial latch-on, always restart. When sucking/swallowing frequency starts to slow down, do breast compressions/massage and tickle baby's feet to keep him alert with feeding. A diaper change between sides can be helpful to keep him alert. Then switch to the L side.      Supplementation plan: Continue to supplement with formula or expressed breast milk as he cues. Give him volumes based on his cues. See chart below for typical intake by age.       Recommended to pump: Pump or hand express on the R side after " "nursing if he does not empty this breast well or if you still have firm or painful areas.  Using warmth before/during nursing or pumping and ice afterwards is sometimes helpful.      Continue to monitor output, expect at least 6 wet diapers per day.         Follow up as needed for ongoing lactation concerns.      Average Infant Milk Intake by Age     Age Average milk volume per feeding (mL) Average milk volume per feeding (oz) Average 24 hour milk intake (mL) Average 24 hour milk intake (oz)   Day 1 Few drops - 5mL < tsp Up to 30 mL Up to 1 oz   Day 2 5 - 15 mL <0.5 oz - 1 TB 30 - 120 mL 1 - 4 oz   Day 3 15 - 30 mL  0.5 - 1 oz 120 - 240 mL 4 - 8 oz   Day 4 30 - 45 mL  1 - 1.5 oz 240 - 360 mL 8 - 12 oz   Day 5-7 45 - 60 mL 1.5 - 2 oz 360 - 600 mL 12 - 18 oz   Week 2-3 60 - 90 mL 2 - 3 oz 450 - 750 mL 15 - 25 oz   Months 1-6 90 - 150 mL 3 - 5 oz 750 - 1035 mL 25 - 35 oz      Clogged ducts can be painful and if not relieved can become infected, causing mastitis.      Strategies to relieve a clogged duct:      -Massage over the area, ideally while pumping or nursing. Alternate between massage at the clogged area closer to your nipple to break the clog up, and then massage from \"behind\" the clog towards your nipple to try to release the clog.  -Nurse frequently on the affected side, and move baby around into different positions   -Pump frequently on the affected side or hand express  -A warm shower or bath - epsom salts in the bath can help. Do hand expression/massage while bathing  -Vibration over the area, like with an electric toothbrush  -A haakaa pump - either on its own while nursing or pumping on the other side, or filled with warm water and epsom salts  -\"Dangle pump\" - let gravity help you release the clog  -\"Breast lift\" - this is where you lay on your back for up to 40 minutes (watch a TV show!) And gently lift your breast into the air, rotating where you hold it. This is similar to elevating a sprained ankle " "and will help reduce the swelling   -Warmth (with a heat pack, rice sock etc) BEFORE and WHILE nursing or pumping, and ice AFTER nursing or pumping. Ice will help cut down on the inflammation.   -Take ibuprofen to help reduce inflammation   -Sunflower lecithin can help treat a clogged duct while you're experiencing it, or reduce the likelihood of recurrent clogged ducts. The recommended dose for recurrent plugged ducts is 7781-3307 mg lecithin per day, or one 1200 mg capsule 3-4 times per day. Once the clog has been relieved and things are going well you can gradually reduce the daily dose as tolerated.   -Avoid tight, restrictive clothing - sometimes spaghetti straps can cause a clog to develop.   -Look for a \"bleb\" on the nipple (these are often painful and look like white heads) soaking the area and gently exfoliating the area with a washcloth can open the bleb.   -A significant other can attempt to suck out the clog as they are more likely to have success than a baby or a pump (greater suction power). This is not for everyone but often a successful option.         A clogged area may continue to be tender for a few days even after the clog has been relieved.      Call your provider if you develop signs of mastitis - chills, body aches, fever accompanied by a clogged duct that is firm, warm and tender.          ---           The visit lasted a total of 35 minutes that I spent over video chat with the patient. Of that time, 35 minutes were spent on lactation. Over 50% of the time was spent counseling and educating the patient about feeding difficulties and lactation concerns.      Completed by:   CARLI Mckeon, IBCLC, [facility] - remotely from home, Pediatrics.  4/10/2020 3:02 PM        Video-Visit Details     Type of service:  Video Visit     Video End Time (time video stopped):           Originating Location (pt. Location): Home     Distant Location (provider location):  [facility]" CLINIC PEDIATRICS - remotely from home  Mode of Communication:  Video Conference via [facility]        Alison Cash CNP   4/10/2020  4:08 PM

## 2021-06-07 NOTE — TELEPHONE ENCOUNTER
Spouse called. Patient is still having high blood sugars and would like to be induced early. A notification will need to be sent to her Obgyn.    Please advise.    Tacos @ [phone]

## 2021-06-07 NOTE — ANESTHESIA POSTPROCEDURE EVALUATION
Patient: Melissa Balderrama  * No procedures listed *  Anesthesia type: epidural    Patient location: Labor and Delivery  Last vitals:   Vitals Value Taken Time   BP    3/31/2020  8:37 PM   Temp  3/31/2020  8:37 PM   Pulse 102 3/31/2020  8:22 PM   Resp      3/31/2020  8:37 PM   SpO2 100 % 3/31/2020  8:22 PM   Vitals shown include unvalidated device data.  Post vital signs: stable  Level of consciousness: awake and return to baseline  Post-anesthesia pain: pain controlled  Post-anesthesia nausea and vomiting: no  Pulmonary: unassisted, return to baseline  Cardiovascular: stable and blood pressure at baseline  Hydration: adequate  Anesthetic events: no, epidural resolved, no apparent complications    QCDR Measures:  ASA# 11 - [place]-op Cardiac Arrest: ASA11B - Patient did NOT experience unanticipated cardiac arrest  ASA# 12 - [place]-op Mortality Rate: ASA12B - Patient did NOT die  ASA# 13 - PACU Re-Intubation Rate: NA - No ETT / LMA used for case  ASA# 10 - Composite Anes Safety: ASA10A - No serious adverse event    Additional Notes:

## 2021-06-07 NOTE — PROGRESS NOTES
Converted to phone visit:    Assessment:  Melissa has GDM.   She is 38+ weeks gestation.    Started 8 units Lantus insulin on 3/18/20.    Spoke with spouse(Mahad) and Melissa via phone.   The insulin is going well.  No questions about injecting it.  No symptoms hypoglycemia.        3/19/20  FBS  93 mg/dl -   8 units lantus  since 3/20/20..FBS:  95, 95, 101, 95 mg/ -  6 units Lantus,  on their own, decrease dosage from 8 units to 6 units Lantus    Since 3/19, all post meal reading WNL with two exceptions:  152, 143 mg/dl - believed to be food related.   She has been walking as able after eating which is helping.   She is feeling well, active baby.   Scheduled for preg clinic phone visit this week.    Plan:  Instructed to use 8 units Lantus insulin at bedtime.  Pt/spouse verbalized their understanding.  Reviewed FBS target range.     Reviewed signs/treatment for hypoglycemia.       Provider: Dr. Burgess  Provider's Diagnosis (per referral form) Gestational Diabetes (648.83)     OGTT: 113/231/--/--  EDC: Estimated Date of Delivery: 4/5/20  Pregnancy number: 1  Previous GDM: No     PNV: Yes  Supplements: No     BG monitoring goals: Fasting <95; 1 hour post start of meal <140. Test 4 x per day.  Check fasting a.m. ketones: Yes  GDM meal pattern/carb counting taught per guidelines    Education:     Monitoring   Meter (per above goals): assessment, discussed,  competent  Monitoring: assessment, discussed, pt returned demonstration, literature provided- competent   BG goals: assessment, discussed,       Nutrition Management:  assessment, discussed, - good appetite, doing well  3 meals/3 snacks  Portions/Balance: assessment, discussed,    Physical Activity: assessment, discussed,   encouraged as able  Medications: assessment, discussed  -  insulin   reviewed    Injected Medications: Assessed and Discussed   Storage/Exp:Assessed and Discussed   Site Rotation: Assessed and Discussed     Diabetes Disease Process:  Discussed    Acute Complications: Prevent, Detect, Treat:  Hypoglycemia: Assessed, Discussed and Patient returned demonstration  Goal Setting and Problem Solving: Discussed  Barriers: Discussed  Psychosocial Adjustments: Discussed      Jessica Campbell  3/23/2020    15 minutes MNT    I agree with the aforementioned diabetes plan.  Adelina LO [facility] Endocrinology  3/23/2020  10:02 AM

## 2021-06-07 NOTE — ANESTHESIA PREPROCEDURE EVALUATION
Anesthesia Evaluation      Patient summary reviewed   No history of anesthetic complications     Airway   Mallampati: II  Neck ROM: full   Pulmonary - negative ROS                          Cardiovascular - negative ROS   Neuro/Psych - negative ROS     Endo/Other - negative ROS      GI/Hepatic/Renal - negative ROS           Dental                         Anesthesia Plan  Planned anesthetic: epidural    ASA 2     Anesthetic plan and risks discussed with: patient

## 2021-06-07 NOTE — TELEPHONE ENCOUNTER
I have not seen the patient and will not weigh in on possible induction. I don't know if her BG are well managed or not, and this is not within my wheelhouse to make that kind of decision. That is between her and her OB.

## 2021-06-07 NOTE — TELEPHONE ENCOUNTER
"Spoke with Tacos. He states pt is in with the gynecologist right now. Informed that we cannot advise on delivery. He verbalized understanding. He states FBG have been 95-96. One reading after dinner at 202 for unknown reason. Otherwise pp have been \"normal.\"   No lows.   Advised to increase to 12 units at HS as FBG are borderline.   Pt/ will call back with any other concerns.   "

## 2021-06-07 NOTE — ANESTHESIA PROCEDURE NOTES
Epidural Block    Patient location during procedure: OB  Time Called: 3/31/2020 9:39 AM  Reason for Block:labor epidural  Staffing:  Performing  Anesthesiologist: Matthieu Paul IV, MD  Preanesthetic Checklist  Completed: patient identified, risks, benefits, and alternatives discussed, timeout performed, consent obtained, at patient's request, airway assessed, oxygen available, suction available, emergency drugs available and hand hygiene performed  Procedure  Patient position: sitting  Prep: ChloraPrep  Patient monitoring: continuous pulse oximetry, heart rate and blood pressure  Approach: midline  Location: L4-L5  Injection technique: SAMSON saline    Needle  Needle type: Symone   Needle gauge: 18 G     Catheter in Space: 4  Assessment  Sensory level:  No complications

## 2021-06-08 NOTE — TELEPHONE ENCOUNTER
External - A & T -   Referring Provider: Nancy Burgess  DX: DM type 2, post partum evaul.    Ref./rec. Were received on... 5/11/2020 1:14pm (inside DM Fax folder)

## 2021-06-08 NOTE — PROGRESS NOTES
"Clinic Note    Assessment:     Assessment and Plan:  1. Hyperglycemia  Rather than check a fasting glucose today (she has not fasting), I am going to send in testing strips so that she can check fasting blood sugars at home and report those numbers back to me.  Ultimately, I would like to get a hemoglobin A1c in about a month (3 months after the birth of her child).    See patient's directions below for plan of care.    - blood glucose test strips; Use 1 each As Directed as needed. Dispense brand per patient's insurance at pharmacy discretion.  Dispense: 100 strip; Refill: 3  - Glycosylated Hemoglobin A1c; Future       Patient Instructions   We will send more testing strips for you today.    Check your fasting blood sugar every morning this next week.  Send me a message via [employer], or call the clinic and leave me a message with your results.    If your measurements are consistently greater than 126, we may need to consider adding a medication like metformin.    My hunch is that your measurements will be close to goal range.    Lets plan on having you schedule a \"lab only\" appointment for early July so that we can check a hemoglobin A1c.    Return in about 1 month (around 6/29/2020).         Subjective:      Melissa Balderrama is a 25 y.o. female who presents to the clinic today to discuss elevated blood sugars.    She had a baby at the end of March.  There were no complications with that pregnancy other than the fact that she was dealing with gestational diabetes, treated with insulin, 8 units every evening.    Most of her blood sugars were within goal range over the course of her pregnancy with treatment.    She had a follow-up appointment with her obstetrician about 2-1/2 weeks ago with a fasting blood sugar of 129 at that point.  She was referred over to the family medicine clinic here for possible treatment.    She does have a glucometer and testing equipment at home but is requesting refills of the testing strips " "today so that she can continue monitoring her blood sugars.    She does admit that she eats quite a bit of rice and other starchy foods.  Tries to limit sugary beverages.    There is a family history of diabetes, father uses metformin.    The following portions of the patient's history were reviewed and updated as appropriate: Allergies, medications, problems, prior note.    Review of Systems:    Review is otherwise negative except for what is mentioned above.     Social Hx:    Social History     Tobacco Use   Smoking Status Never Smoker   Smokeless Tobacco Never Used         Objective:     Vitals:    05/29/20 1533   BP: 99/63   Pulse: 78   Temp: 98.9  F (37.2  C)   TempSrc: Oral   SpO2: 98%   Weight: 158 lb (71.7 kg)       Exam: Not done    Patient Active Problem List   Diagnosis     Insulin controlled gestational diabetes mellitus (GDM) in third trimester     Normal delivery     Current Outpatient Medications   Medication Sig Dispense Refill     prenatal vitamin iron-folic acid 27mg-0.8mg (PRENATAL S) 27 mg iron- 800 mcg Tab tablet Take 1 tablet by mouth daily.       acetaminophen (TYLENOL) 325 MG tablet Take 1-2 tablets (325-650 mg total) by mouth every 4 (four) hours as needed.  0     blood glucose test strips Use 1 each As Directed as needed. Dispense brand per patient's insurance at pharmacy discretion. 100 strip 3     ibuprofen (ADVIL,MOTRIN) 200 MG tablet Take 3 tablets (600 mg total) by mouth every 6 (six) hours as needed for pain.       ONETOUCH DELICA LANCETS 30 gauge Misc Check blood sugar 4 times per day. 200 each 1     pen needle, diabetic (BD ULTRA-FINE TRISTEN PEN NEEDLE) 32 gauge x 5/32\" Ndle Inject 1 each under the skin at bedtime. 100 each 0     No current facility-administered medications for this visit.        I spent 15 minutes with patient face to face, of which >50% was counseling regarding the above plan       Fletcher James (Rob), NI    5/29/2020           "

## 2021-06-08 NOTE — TELEPHONE ENCOUNTER
Per SILVIO and Azucena- patient should be evaluated by PCP since patient was only seen as a Gestational patient.   PCP will have to evaluate and manage high fasting blood sugar.

## 2021-06-08 NOTE — TELEPHONE ENCOUNTER
05.12- Spoke w/Adefris and Toppin. Message will be sent to nursing staff.   When nursing staff calls, please inform nursing staff patient will need to be evaluated by PCP since she is no longer considered Gestational and if she has DM Type II, PCP should be managing newly Dx.

## 2021-06-08 NOTE — PATIENT INSTRUCTIONS - HE
"We will send more testing strips for you today.    Check your fasting blood sugar every morning this next week.  Send me a message via [employer], or call the clinic and leave me a message with your results.    If your measurements are consistently greater than 126, we may need to consider adding a medication like metformin.    My hunch is that your measurements will be close to goal range.    Lets plan on having you schedule a \"lab only\" appointment for early July so that we can check a hemoglobin A1c.  "

## 2021-06-11 NOTE — PROGRESS NOTES
Assessment:   1. Annual physical exam  Healthy 25-year-old female.    2. Healthcare maintenance  Flu shot today.  Pap smear due in 2022.  Tdap received in January 2020.    3. History of gestational diabetes  She had a hemoglobin A1c checked this past July which was normal.  She did have gestational diabetes this past year which was successfully treated with insulin.  She is working hard to continue with healthy diet choices.  Walks daily.  BMI is 25.  She would like to have another A1c checked in October.  - Glycosylated Hemoglobin A1c; Future    4. Reactive airway disease without complication, unspecified asthma severity, unspecified whether persistent  She had a wheezing issue a little over 1 year ago which was successfully treated with albuterol.  She wanted her chart updated to reflect that in her history/problem list.  Today, she states that she has been breathing well over the last year with no exacerbations.         Plan:     Patient Instructions   Blood pressure and other vital signs look good today.    Weight looks good.    No need to continue checking your blood sugars.    We will recheck a hemoglobin A1c in 2 weeks.  At that time, make sure that you are fasting so that we can check your cholesterol as well.    Schedule an appointment with a dentist to have your teeth looked at.  It is a good idea to have this done at least once per year.    Continue with healthy lifestyle.    If you ever have an issue with wheezing, call and let me know.    Follow-up in 1 year, sooner if needed.      Subjective:     Here for physical exam. Chart reviewed     Has a healthy baby boy at home.  Sleeping well.  Does not use alcohol or tobacco.  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  Walks daily.  Has never been to the dentist.  No issues with anxiety or depression.  Normal bowels.  Does not routinely check her blood sugars anymore.      Past Medical History:   Diagnosis Date     Diabetes mellitus (H)     GDM- Insulin controlled  "    Wheezing     H/O but with pregnancy no wheezing     No past surgical history on file.  Patient has no known allergies.  No family history on file.  Social History     Socioeconomic History     Marital status:      Spouse name: Not on file     Number of children: Not on file     Years of education: Not on file     Highest education level: Not on file   Occupational History     Not on file   Social Needs     Financial resource strain: Not on file     Food insecurity     Worry: Not on file     Inability: Not on file     Transportation needs     Medical: Not on file     Non-medical: Not on file   Tobacco Use     Smoking status: Never Smoker     Smokeless tobacco: Never Used   Substance and Sexual Activity     Alcohol use: Not Currently     Drug use: Never     Sexual activity: Yes     Partners: Male   Lifestyle     Physical activity     Days per week: Not on file     Minutes per session: Not on file     Stress: Not on file   Relationships     Social connections     Talks on phone: Not on file     Gets together: Not on file     Attends Sikh service: Not on file     Active member of club or organization: Not on file     Attends meetings of clubs or organizations: Not on file     Relationship status: Not on file     Intimate partner violence     Fear of current or ex partner: Not on file     Emotionally abused: Not on file     Physically abused: Not on file     Forced sexual activity: Not on file   Other Topics Concern     Not on file   Social History Narrative     Not on file         Review of Systems  Please see form B           Objective:     Vitals:    09/28/20 0808   BP: 96/52   Pulse: 76   SpO2: 98%   Weight: 156 lb (70.8 kg)   Height: 5' 6\" (1.676 m)       Physical  General Appearance: Alert, cooperative, no distress, appears stated age  Head: Normocephalic, without obvious abnormality, atraumatic  Eyes: PERRL, fundi not observed, EOM's intact  Ears: Normal TM's and external ear canals " bilateral  Nose: No abnormalities noted  Mouth and Throat: Normal appearance   Neck: Supple, symmetrical, trachea midline, no adenopathy or thyromegally,  no tenderness/mass/nodules; no carotid bruit or JVD  Back: no CVA tenderness or spinous process pain  Lungs: Clear to auscultation bilaterally, good air movent  Heart: Regular rate and rhythm, S1 and S2 normal, no murmur, rub, or gallop,  Abdomen: Soft, non-tender, no masses, no organomegaly  Genitourinary:.  No hernias   Rectal exam: Not done  Musculoskeletal: No gross abnormalities    Extremities: Extremities normal, atraumatic, no cyanosis or edema, pulses 2/4 and symmetric  Skin:  no  worrisome lesions noted  Lymph nodes: Cervical, supraclavicular, groin, and axillary nodes normal  Neurologic: CN2-12 intact, normal sensation, DTRs 2/4 and symmetric.   Psychiatric:  normal mood and affect.      Current Outpatient Medications   Medication Sig Dispense Refill     prenatal vitamin iron-folic acid 27mg-0.8mg (PRENATAL S) 27 mg iron- 800 mcg Tab tablet Take 1 tablet by mouth daily.       blood glucose test strips Use 1 each As Directed as needed. Dispense brand per patient's insurance at pharmacy discretion. 100 strip 3     No current facility-administered medications for this visit.

## 2021-06-11 NOTE — PATIENT INSTRUCTIONS - HE
Blood pressure and other vital signs look good today.    Weight looks good.    No need to continue checking your blood sugars.    We will recheck a hemoglobin A1c in 2 weeks.  At that time, make sure that you are fasting so that we can check your cholesterol as well.    Schedule an appointment with a dentist to have your teeth looked at.  It is a good idea to have this done at least once per year.    Continue with healthy lifestyle.    If you ever have an issue with wheezing, call and let me know.    Follow-up in 1 year, sooner if needed.

## 2021-06-16 PROBLEM — O24.414 INSULIN CONTROLLED GESTATIONAL DIABETES MELLITUS (GDM) IN THIRD TRIMESTER: Status: ACTIVE | Noted: 2020-01-23

## 2021-06-16 PROBLEM — J45.909 REACTIVE AIRWAY DISEASE WITHOUT COMPLICATION: Status: ACTIVE | Noted: 2020-09-28

## 2021-06-19 ENCOUNTER — COMMUNICATION - HEALTHEAST (OUTPATIENT)
Dept: FAMILY MEDICINE | Facility: CLINIC | Age: 26
End: 2021-06-19

## 2021-06-19 DIAGNOSIS — R73.9 HYPERGLYCEMIA: ICD-10-CM

## 2021-06-20 ENCOUNTER — HEALTH MAINTENANCE LETTER (OUTPATIENT)
Age: 26
End: 2021-06-20

## 2021-06-26 NOTE — TELEPHONE ENCOUNTER
RN cannot approve Refill Request    RN can NOT refill this medication PCP messaged that patient is overdue for Office Visit and Protocol failed and NO refill given. Last office visit: 5/29/2020 Fletcher James CNP Last Physical: 9/28/2020 Last MTM visit: Visit date not found Last visit same specialty: 5/29/2020 Fletcher James CNP.  Next visit within 3 mo: Visit date not found  Next physical within 3 mo: Visit date not found  PCP is not with [facility].    Jaky Georges, [facility] Triage/Med Refill 6/19/2021    Requested Prescriptions   Pending Prescriptions Disp Refills     ONETOUCH VERIO TEST STRIPS strips [Pharmacy Med Name: ONE TOUCH VERIO TEST STRIP] 50 strip 7     Sig: USE TO TEST ONCE DAILY AS NEEDED       Diabetic Supplies Refill Protocol Failed - 6/19/2021 12:18 AM        Failed - Visit with PCP or prescribing provider visit in last 6 months     Last office visit with prescriber/PCP: 5/29/2020 Fletcher James CNP OR same dept: Visit date not found OR same specialty: 5/29/2020 Fletcher James CNP  Last physical: 9/28/2020 Last MTM visit: Visit date not found   Next visit within 3 mo: Visit date not found  Next physical within 3 mo: Visit date not found  Prescriber OR PCP: Fletcher James CNP  Last diagnosis associated with med order: 1. Hyperglycemia  - ONETOUCH VERIO TEST STRIPS strips [Pharmacy Med Name: ONE TOUCH VERIO TEST STRIP]; USE TO TEST ONCE DAILY AS NEEDED  Dispense: 50 strip; Refill: 7    If protocol passes may refill for 12 months if within 3 months of last provider visit (or a total of 15 months).             Failed - A1C in last 6 months     Hemoglobin A1c   Date Value Ref Range Status   07/10/2020 5.5 3.5 - 6.0 % Final

## 2021-07-12 ENCOUNTER — NURSE TRIAGE (OUTPATIENT)
Dept: NURSING | Facility: CLINIC | Age: 26
End: 2021-07-12

## 2021-07-12 ENCOUNTER — OFFICE VISIT (OUTPATIENT)
Dept: INTERNAL MEDICINE | Facility: CLINIC | Age: 26
End: 2021-07-12
Payer: COMMERCIAL

## 2021-07-12 VITALS — OXYGEN SATURATION: 98 % | WEIGHT: 161 LBS | BODY MASS INDEX: 25.88 KG/M2 | HEIGHT: 66 IN

## 2021-07-12 DIAGNOSIS — R68.84 JAW PAIN: Primary | ICD-10-CM

## 2021-07-12 DIAGNOSIS — M26.609 TMJ (TEMPOROMANDIBULAR JOINT SYNDROME): ICD-10-CM

## 2021-07-12 PROCEDURE — 99203 OFFICE O/P NEW LOW 30 MIN: CPT | Performed by: INTERNAL MEDICINE

## 2021-07-12 ASSESSMENT — MIFFLIN-ST. JEOR: SCORE: 1477.04

## 2021-07-12 NOTE — TELEPHONE ENCOUNTER
"Pt reports right sided facial and jaw pain x 2 days. Stabbing, sharp pain. Today rated at 8/10. Has taken ibuprofen 200 mg at 1:30 AM, did not help.    Hard to chew. Can fully close her mouth.    No dental concern.  No chest pain.  No facial swelling    States this has happened once during her pregnancy.      Per protocol, advised OV today. May go to [facility] if no clinic openings. Care advice reviewed - may take ibuprofen 400 mg now and every 6 hours, make sure to take it with food. Patient and  verbalizes understanding. Advised to call back with further questions/concerns.     Ashanti Anthony RN/[place] Nurse Advisor      Reason for Disposition    SEVERE pain (e.g., excruciating, unable to do any normal activities)    Additional Information    Negative: Shock suspected (e.g., cold/pale/clammy skin, too weak to stand, low BP, rapid pulse)    Negative: Similar pain previously and it was from 'heart attack'    Negative: Similar pain previously and it was from 'angina' and not relieved by nitroglycerin    Negative: Sounds like a life-threatening emergency to the triager    Negative: Difficulty breathing or unusual sweating (e.g., sweating without exertion)    Negative: Can't close the mouth fully (i.e., \"mouth is locked open\", patient will have difficulty talking)    Negative: Fever and localized red rash    Negative: Fever and area of face is swollen    Negative: New onset jaw pain of unknown cause AND at least one cardiac risk factor (i.e., hypertension, diabetes, obesity, smoker or strong family history of heart disease)    Negative: Patient sounds very sick or weak to the triager    Protocols used: FACE PAIN-A-OH      "

## 2021-07-12 NOTE — PROGRESS NOTES
"   Assessment & Plan     Jaw pain most likely TMJ  26-year-old woman with right-sided face and jaw pain for the past several days.  Describes sharp stabbing pain in right side of face near her jaw.  Symptoms are intermittent and are improved after taking ibuprofen.  She had similar symptoms 2 years ago during her pregnancy.  Pain seems worse when biting on right side or opening jaw fully.  Exam without abnormality seen or felt.  No rash.  I suspect this represents TMJ.  Less likely trigeminal neuralgia.  Recommending ibuprofen 40 mg 3 times daily for 7 days and to avoid chewing or biting on the right side.  She should also schedule appointment with her dentist to make sure there is no contributing problem present.           BMI:   Estimated body mass index is 25.99 kg/m  as calculated from the following:    Height as of this encounter: 1.676 m (5' 6\").    Weight as of this encounter: 73 kg (161 lb).            Patient Instructions   This is most likely TMJ.  Use 400 mg of ibuprofen 3 times daily for the next 7 days.  Take with food.    Avoid chewing or biting on the right side of the mouth for the next week    Schedule appointment with your dentist    An alternative diagnosis is trigeminal neuralgia.  If symptoms are not improving with the above measures, please let me know.      Patient Education     TMJ Syndrome  The temporomandibular joint (TMJ) is the joint that connects your lower jaw to your skull. You can feel it in front of your ears when you open and close your mouth. TMJ disorders cause chronic or recurrent pain and problems in the jaw joint and the muscles that control jaw movement. Symptoms of TMJ disorders are usually relieved with minor treatments. But symptoms may come back, especially in times of stress.   Causes  There is no widely agreed-on cause of TMJ disorders. They have been linked to injury, arthritis, tooth and jaw alignment, chronic fatigue syndrome, and fibromyalgia. A definite connection " has not been shown to these, though.   Symptoms    Pain in the face, jaw, or neck    Pain with jaw movement or chewing    Locking or catching sensation of the jaw    Clicking, popping, or grinding sounds with movement of the TMJ    Headache    Ear pain    Home care  Modest treatments are a good first step toward relieving symptoms. Try these methods.     Reduce stress on your jaw by not eating crunchy or hard-to-chew foods. Don t eat hard or sticky candies. Soft foods and liquids are easier on the jaw.    Protect your jaw while yawning. If you need to yawn, put your fist under your chin to prevent your mouth from opening too wide.    To help relieve pain, put hot or cold packs on the area that hurts. Try both hot and cold to find out which works best for you. To make a cold pack, put ice cubes in a plastic bag that seals at the top. Wrap the bag in a clean, thin towel or cloth. Never put ice or an ice pack directly on the skin. If you use hot packs (small towels soaked in hot water), be careful not to burn yourself.    You may take acetaminophen or ibuprofen for pain, unless you were given a different pain medicine. (Note: If you have chronic liver or kidney disease or have ever had a stomach ulcer or gastrointestinal bleeding, talk with your healthcare provider before using these medicines. Also talk to your provider if you are taking medicine to prevent blood clots.) Don t give aspirin to a child younger than age 19 unless directed by the child s provider. Taking aspirin can put a child at risk for Reye syndrome. This is a rare but very serious disorder that most often affects the brain and the liver.  Reducing stress  If stress seems to be contributing to your symptoms, try to find the sources of stress in your life. These aren t always obvious. Common stressors include:     Everyday hassles. These include things such as traffic jams, missed appointments, or car trouble.    Major life changes. These can be good,  such as a new baby or job promotion. Or they can be bad, such as losing a job or losing a loved one.    Overload. This is the feeling that you have too many responsibilities and can't take care of everything at once.    Helplessness. This is when you feel like your problems are more than you can solve.  When possible, try to make changes in your sources of stress. See if you can avoid hassles, limit the amount of change in your life at one time, and take breaks when you feel overloaded.   Many stressful situations can't be avoided. So learning how to manage stress is very important. To make everyday stress more manageable:     Getting regular exercise.    Eat nutritious, balanced meals.    Get enough rest.    Try relaxation and breathing exercises, visualization, biofeedback, or meditation.    Take some time out to clear your mind.  For more information, talk with your healthcare provider.  Follow-up care  Follow up with your healthcare provider, or as advised. More testing and other treatment may be needed. If changes to your lifestyle do not improve your symptoms, talk with your healthcare provider about other treatments. These include bite guards for help with teeth grinding, stress management methods, and more. If stress is an important factor and does not respond to the above lifestyle changes, talk with your healthcare provider about a referral for stress management.   If X-rays were done, they will be reviewed by a specialist. You will be told the results and if they affect your treatment.   Call 911  Call 911 if you have any of these:     Trouble breathing or swallowing    Wheezing    Confusion    Extreme drowsiness or trouble awakening    Fainting or loss of consciousness    Fast heart rate  When to seek medical advice  Call your healthcare provider right away if you have any of these:     Swollen or red face    Jaw or face pain that gets worse    Neck, mouth, tooth, or throat pain that gets worse    Fever  "of 100.4 F (38 C) or higher, or as directed by your healthcare provider  Carlos last reviewed this educational content on 8/1/2020 2000-2021 [employer]. All rights reserved. This information is not intended as a substitute for professional medical care. Always follow your healthcare professional's instructions.               Return if symptoms worsen or fail to improve.          30 minutes spent on the date of the encounter doing chart review, history and exam, documentation and further activities per the note    Subjective       HPI 26-year-old woman with right-sided face and jaw pain.  See assessment and plan for details of visit    Current Outpatient Medications   Medication     blood glucose test strips     ONETOUCH VERIO TEST STRIPS strips     No current facility-administered medications for this visit.        Review of Systems  No fevers or chills.  No rash      Objective    Vitals:    07/12/21 1554   SpO2: 98%   Weight: 73 kg (161 lb)   Height: 1.676 m (5' 6\")        Physical Exam  No palpable tenderness over jaw or submandibular region.  TMs normal.  No rash identified.  Oropharynx normal.  Cranial nerves intact.      "

## 2021-07-12 NOTE — PATIENT INSTRUCTIONS
This is most likely TMJ.  Use 400 mg of ibuprofen 3 times daily for the next 7 days.  Take with food.    Avoid chewing or biting on the right side of the mouth for the next week    Schedule appointment with your dentist    An alternative diagnosis is trigeminal neuralgia.  If symptoms are not improving with the above measures, please let me know.      Patient Education     TMJ Syndrome  The temporomandibular joint (TMJ) is the joint that connects your lower jaw to your skull. You can feel it in front of your ears when you open and close your mouth. TMJ disorders cause chronic or recurrent pain and problems in the jaw joint and the muscles that control jaw movement. Symptoms of TMJ disorders are usually relieved with minor treatments. But symptoms may come back, especially in times of stress.   Causes  There is no widely agreed-on cause of TMJ disorders. They have been linked to injury, arthritis, tooth and jaw alignment, chronic fatigue syndrome, and fibromyalgia. A definite connection has not been shown to these, though.   Symptoms    Pain in the face, jaw, or neck    Pain with jaw movement or chewing    Locking or catching sensation of the jaw    Clicking, popping, or grinding sounds with movement of the TMJ    Headache    Ear pain    Home care  Modest treatments are a good first step toward relieving symptoms. Try these methods.     Reduce stress on your jaw by not eating crunchy or hard-to-chew foods. Don t eat hard or sticky candies. Soft foods and liquids are easier on the jaw.    Protect your jaw while yawning. If you need to yawn, put your fist under your chin to prevent your mouth from opening too wide.    To help relieve pain, put hot or cold packs on the area that hurts. Try both hot and cold to find out which works best for you. To make a cold pack, put ice cubes in a plastic bag that seals at the top. Wrap the bag in a clean, thin towel or cloth. Never put ice or an ice pack directly on the skin. If you  use hot packs (small towels soaked in hot water), be careful not to burn yourself.    You may take acetaminophen or ibuprofen for pain, unless you were given a different pain medicine. (Note: If you have chronic liver or kidney disease or have ever had a stomach ulcer or gastrointestinal bleeding, talk with your healthcare provider before using these medicines. Also talk to your provider if you are taking medicine to prevent blood clots.) Don t give aspirin to a child younger than age 19 unless directed by the child s provider. Taking aspirin can put a child at risk for Reye syndrome. This is a rare but very serious disorder that most often affects the brain and the liver.  Reducing stress  If stress seems to be contributing to your symptoms, try to find the sources of stress in your life. These aren t always obvious. Common stressors include:     Everyday hassles. These include things such as traffic jams, missed appointments, or car trouble.    Major life changes. These can be good, such as a new baby or job promotion. Or they can be bad, such as losing a job or losing a loved one.    Overload. This is the feeling that you have too many responsibilities and can't take care of everything at once.    Helplessness. This is when you feel like your problems are more than you can solve.  When possible, try to make changes in your sources of stress. See if you can avoid hassles, limit the amount of change in your life at one time, and take breaks when you feel overloaded.   Many stressful situations can't be avoided. So learning how to manage stress is very important. To make everyday stress more manageable:     Getting regular exercise.    Eat nutritious, balanced meals.    Get enough rest.    Try relaxation and breathing exercises, visualization, biofeedback, or meditation.    Take some time out to clear your mind.  For more information, talk with your healthcare provider.  Follow-up care  Follow up with your  healthcare provider, or as advised. More testing and other treatment may be needed. If changes to your lifestyle do not improve your symptoms, talk with your healthcare provider about other treatments. These include bite guards for help with teeth grinding, stress management methods, and more. If stress is an important factor and does not respond to the above lifestyle changes, talk with your healthcare provider about a referral for stress management.   If X-rays were done, they will be reviewed by a specialist. You will be told the results and if they affect your treatment.   Call 911  Call 911 if you have any of these:     Trouble breathing or swallowing    Wheezing    Confusion    Extreme drowsiness or trouble awakening    Fainting or loss of consciousness    Fast heart rate  When to seek medical advice  Call your healthcare provider right away if you have any of these:     Swollen or red face    Jaw or face pain that gets worse    Neck, mouth, tooth, or throat pain that gets worse    Fever of 100.4 F (38 C) or higher, or as directed by your healthcare provider  Carlos last reviewed this educational content on 8/1/2020 2000-2021 [employer]. All rights reserved. This information is not intended as a substitute for professional medical care. Always follow your healthcare professional's instructions.

## 2021-07-14 PROBLEM — Z34.90 ENCOUNTER FOR INDUCTION OF LABOR: Status: RESOLVED | Noted: 2020-03-31 | Resolved: 2020-04-01

## 2021-08-10 ENCOUNTER — OFFICE VISIT (OUTPATIENT)
Dept: FAMILY MEDICINE | Facility: CLINIC | Age: 26
End: 2021-08-10
Payer: COMMERCIAL

## 2021-08-10 VITALS
OXYGEN SATURATION: 99 % | SYSTOLIC BLOOD PRESSURE: 98 MMHG | BODY MASS INDEX: 25.07 KG/M2 | HEART RATE: 66 BPM | HEIGHT: 66 IN | DIASTOLIC BLOOD PRESSURE: 64 MMHG | WEIGHT: 156 LBS

## 2021-08-10 DIAGNOSIS — N92.6 IRREGULAR MENSTRUAL CYCLE: ICD-10-CM

## 2021-08-10 DIAGNOSIS — Z00.00 HEALTHCARE MAINTENANCE: ICD-10-CM

## 2021-08-10 DIAGNOSIS — J45.20 MILD INTERMITTENT REACTIVE AIRWAY DISEASE WITHOUT COMPLICATION: ICD-10-CM

## 2021-08-10 DIAGNOSIS — O24.414 INSULIN CONTROLLED GESTATIONAL DIABETES MELLITUS (GDM) IN THIRD TRIMESTER: Primary | ICD-10-CM

## 2021-08-10 DIAGNOSIS — Z11.59 NEED FOR HEPATITIS C SCREENING TEST: ICD-10-CM

## 2021-08-10 DIAGNOSIS — Z86.32 HISTORY OF GESTATIONAL DIABETES: ICD-10-CM

## 2021-08-10 DIAGNOSIS — Z11.4 SCREENING FOR HIV (HUMAN IMMUNODEFICIENCY VIRUS): ICD-10-CM

## 2021-08-10 LAB
ALBUMIN SERPL-MCNC: 3.9 G/DL (ref 3.5–5)
ALP SERPL-CCNC: 116 U/L (ref 45–120)
ALT SERPL W P-5'-P-CCNC: 14 U/L (ref 0–45)
ANION GAP SERPL CALCULATED.3IONS-SCNC: 9 MMOL/L (ref 5–18)
AST SERPL W P-5'-P-CCNC: 17 U/L (ref 0–40)
BILIRUB SERPL-MCNC: 0.6 MG/DL (ref 0–1)
BUN SERPL-MCNC: 7 MG/DL (ref 8–22)
CALCIUM SERPL-MCNC: 8.9 MG/DL (ref 8.5–10.5)
CHLORIDE BLD-SCNC: 105 MMOL/L (ref 98–107)
CHOLEST SERPL-MCNC: 113 MG/DL
CO2 SERPL-SCNC: 25 MMOL/L (ref 22–31)
CREAT SERPL-MCNC: 0.74 MG/DL (ref 0.6–1.1)
FASTING STATUS PATIENT QL REPORTED: YES
GFR SERPL CREATININE-BSD FRML MDRD: >90 ML/MIN/1.73M2
GLUCOSE BLD-MCNC: 106 MG/DL (ref 70–125)
HBA1C MFR BLD: 5.7 %
HCV AB SERPL QL IA: NONREACTIVE
HDLC SERPL-MCNC: 27 MG/DL
HIV 1+2 AB+HIV1 P24 AG SERPL QL IA: NEGATIVE
LDLC SERPL CALC-MCNC: 67 MG/DL
POTASSIUM BLD-SCNC: 4.2 MMOL/L (ref 3.5–5)
PROT SERPL-MCNC: 7.3 G/DL (ref 6–8)
SODIUM SERPL-SCNC: 139 MMOL/L (ref 136–145)
TRIGL SERPL-MCNC: 97 MG/DL

## 2021-08-10 PROCEDURE — 99213 OFFICE O/P EST LOW 20 MIN: CPT | Mod: 25 | Performed by: FAMILY MEDICINE

## 2021-08-10 PROCEDURE — 80053 COMPREHEN METABOLIC PANEL: CPT | Performed by: FAMILY MEDICINE

## 2021-08-10 PROCEDURE — 36415 COLL VENOUS BLD VENIPUNCTURE: CPT | Performed by: FAMILY MEDICINE

## 2021-08-10 PROCEDURE — 99395 PREV VISIT EST AGE 18-39: CPT | Mod: 25 | Performed by: FAMILY MEDICINE

## 2021-08-10 PROCEDURE — 80061 LIPID PANEL: CPT | Performed by: FAMILY MEDICINE

## 2021-08-10 PROCEDURE — 90471 IMMUNIZATION ADMIN: CPT | Performed by: FAMILY MEDICINE

## 2021-08-10 PROCEDURE — 87389 HIV-1 AG W/HIV-1&-2 AB AG IA: CPT | Performed by: FAMILY MEDICINE

## 2021-08-10 PROCEDURE — 90732 PPSV23 VACC 2 YRS+ SUBQ/IM: CPT | Performed by: FAMILY MEDICINE

## 2021-08-10 PROCEDURE — 86803 HEPATITIS C AB TEST: CPT | Performed by: FAMILY MEDICINE

## 2021-08-10 PROCEDURE — 83036 HEMOGLOBIN GLYCOSYLATED A1C: CPT | Performed by: FAMILY MEDICINE

## 2021-08-10 RX ORDER — ALBUTEROL SULFATE 90 UG/1
2 AEROSOL, METERED RESPIRATORY (INHALATION) EVERY 6 HOURS
Qty: 18 G | Refills: 3 | Status: SHIPPED | OUTPATIENT
Start: 2021-08-10

## 2021-08-10 RX ORDER — NORETHINDRONE ACETATE AND ETHINYL ESTRADIOL .03; 1.5 MG/1; MG/1
1 TABLET ORAL DAILY
Qty: 90 TABLET | Refills: 1 | Status: SHIPPED | OUTPATIENT
Start: 2021-08-10

## 2021-08-10 ASSESSMENT — MIFFLIN-ST. JEOR: SCORE: 1450.39

## 2021-08-10 NOTE — PROGRESS NOTES
Assessment/Plan:     Patient presents today for routine physical examination.    Healthcare Maintenance: USPSTF recommendations for age 26;  Patient has been counseled on/screened for:  - the benefits of supplemental folic acid   - intimate partner violence and there are no concerns at this time  - a healthful diet and physical activity for CVD prevention  - Diabetes and hyperlipidemia: screening was performed.   Sexually transmitted infections: Patient would not like to be screened for chlamydia, gonorrhea, syphilis, HIV, and hepatitis  Immunizations: up to date except for [id number] which was recommended  Cervical Cancer Screening: due for pap in 2022      Additional concerns are as detailed below:    1. Insulin controlled gestational diabetes mellitus (GDM) in third trimester  Unlikely to still be present.  HbA1c pending.    2. Mild intermittent reactive airway disease without complication  Ordered just in case  - albuterol (PROAIR HFA/PROVENTIL HFA/VENTOLIN HFA) 108 (90 Base) MCG/ACT inhaler; Inhale 2 puffs into the lungs every 6 hours  Dispense: 18 g; Refill: 3    3. Irregular menstrual cycle  Trial of initiation on oral contraceptives for 3 months to see if this can regulate her cycle.  - norethindrone-ethinyl estradiol (MICROGESTIN 1.5/30) 1.5-30 MG-MCG tablet; Take 1 tablet by mouth daily  Dispense: 90 tablet; Refill: 1      AVS printed and given to patient.  Return to clinic in 1 year.    I have had an Advance Directives discussion with the patient.    This note has been dictated using voice recognition software. Any grammatical or context distortions are unintentional and inherent to the the software.     Lynn Washington MD  [facility]    Subjective:     Melissa Balderrama is a 26 year old female who presents to clinic for routine physical.    Additional concerns include:    1. Patient has been having menstrual cycles again at 4 months postpartum. The periods were lighter and irregular. Now  "she is having very long periods, they are not terribly heavy. She has no real cramping.     PHQ-2 Score:  0    Health Care Directive: discussed    Patient Care Team:   PCP: Lynn Washington     Past Medical History, Family History, and Social History reviewed.     Review of systems:  Patient endorses: menstrual symptoms, weight changes  The remainder of the 10 system review is otherwise negative.    Objective:     BP 98/64   Pulse 66   Ht 1.67 m (5' 5.75\")   Wt 70.8 kg (156 lb)   LMP 07/26/2021   SpO2 99%   BMI 25.37 kg/m    Gen: Alert, NAD, appears stated age, normal hygiene   Eyes: conjunctivae without injection, sclera clear, EOMI  ENT/mouth: nares clear, septum midline, absent rhinorrhea,absent pharyngeal injection, neck is supple, no thyroid enlargement  CV: RRR, no murmur appreciated, pedal edema absent bilaterally  Resp: CTAB, no wheezes, rales or ronchi  ABD: normoactive, non-tender to palpation, nondistended  MSK: grossly full range of motion in all joints, no obvious deformity  Neuro: CN II-XII grossly intact, no deficits in coordination  Psych: no apparent hallucinations or delusions, no pressured speech; alert, oriented x3  SKIN: dry and without lesions  Heme/lymph: no pallor, no active bleeding/bruising, no adenopathy appreciated    Medications:  Current Outpatient Medications   Medication     albuterol (PROAIR HFA/PROVENTIL HFA/VENTOLIN HFA) 108 (90 Base) MCG/ACT inhaler     norethindrone-ethinyl estradiol (MICROGESTIN 1.5/30) 1.5-30 MG-MCG tablet     No current facility-administered medications for this visit.       Allergies:  No Known Allergies    PMH:  Past Medical History:   Diagnosis Date     Diabetes mellitus (H)     GDM- Insulin controlled     Jaw pain 7/12/2021     Wheezing     H/O but with pregnancy no wheezing       PSH:  History reviewed. No pertinent surgical history.    Family Hx:  History reviewed. No pertinent family history.    Social History:  Social History     Socioeconomic " History     Marital status:      Spouse name: Not on file     Number of children: Not on file     Years of education: Not on file     Highest education level: Not on file   Occupational History     Not on file   Tobacco Use     Smoking status: Never Smoker     Smokeless tobacco: Never Used   Substance and Sexual Activity     Alcohol use: Not Currently     Drug use: Never     Sexual activity: Yes     Partners: Male   Other Topics Concern     Not on file   Social History Narrative     Not on file     Social Determinants of Health     Financial Resource Strain:      Difficulty of Paying Living Expenses:    Food Insecurity:      Worried About Running Out of Food in the Last Year:      Ran Out of Food in the Last Year:    Transportation Needs:      Lack of Transportation (Medical):      Lack of Transportation (Non-Medical):    Physical Activity:      Days of Exercise per Week:      Minutes of Exercise per Session:    Stress:      Feeling of Stress :    Social Connections:      Frequency of Communication with Friends and Family:      Frequency of Social Gatherings with Friends and Family:      Attends [employer] Services:      Active Member of Clubs or Organizations:      Attends Club or Organization Meetings:      Marital Status:    Intimate Partner Violence:      Fear of Current or Ex-Partner:      Emotionally Abused:      Physically Abused:      Sexually Abused:        No components found for: LDLCALC     Immunization History   Administered Date(s) Administered     COVID-19,PF,Pfizer 04/17/2021, 05/08/2021     Flu, Unspecified 10/09/2019     Influenza Vaccine IM > 6 months Valent IIV4 09/28/2020     Tdap (Adacel,Boostrix) 01/13/2020         Answers for HPI/ROS submitted by the patient on 8/3/2021  Frequency of exercise:: 4-5 days/week  Getting at least 3 servings of Calcium per day:: Yes  Diet:: Regular (no restrictions)  Taking medications regularly:: Yes  Medication side effects:: None  Bi-annual eye exam::  Yes  Dental care twice a year:: NO  Sleep apnea or symptoms of sleep apnea:: None  Additional concerns today:: No  Duration of exercise:: 15-30 minutes

## 2021-08-10 NOTE — LETTER
My Asthma Action Plan    Name: Melissa Balderrama   YOB: 1995  Date: 8/10/2021   My doctor: Lynn Washington MD   My clinic: [facility]        My Rescue Medicine:   Albuterol inhaler (Proair/Ventolin/Proventil HFA)  2-4 puffs EVERY 4 HOURS as needed. Use a spacer if recommended by your provider.   My Asthma Severity:   Intermittent / Exercise Induced  Know your asthma triggers: dust mites             GREEN ZONE   Good Control    I feel good    No cough or wheeze    Can work, sleep and play without asthma symptoms       Take your asthma control medicine every day.     1. If exercise triggers your asthma, take your rescue medication    15 minutes before exercise or sports, and    During exercise if you have asthma symptoms  2. Spacer to use with inhaler: If you have a spacer, make sure to use it with your inhaler             YELLOW ZONE Getting Worse  I have ANY of these:    I do not feel good    Cough or wheeze    Chest feels tight    Wake up at night   1. Keep taking your Green Zone medications  2. Start taking your rescue medicine:    every 20 minutes for up to 1 hour. Then every 4 hours for 24-48 hours.  3. If you stay in the Yellow Zone for more than 12-24 hours, contact your doctor.  4. If you do not return to the Green Zone in 12-24 hours or you get worse, start taking your oral steroid medicine if prescribed by your provider.           RED ZONE Medical Alert - Get Help  I have ANY of these:    I feel awful    Medicine is not helping    Breathing getting harder    Trouble walking or talking    Nose opens wide to breathe       1. Take your rescue medicine NOW  2. If your provider has prescribed an oral steroid medicine, start taking it NOW  3. Call your doctor NOW  4. If you are still in the Red Zone after 20 minutes and you have not reached your doctor:    Take your rescue medicine again and    Call 911 or go to the emergency room right away    See your regular doctor within  2 weeks of an Emergency Room or Urgent Care visit for follow-up treatment.          Annual Reminders:  Meet with Asthma Educator,  Flu Shot in the Fall, consider Pneumonia Vaccination for patients with asthma (aged 19 and older).    Pharmacy: [facility] [id number] [address]    Electronically signed by Lynn Washington MD   Date: 08/10/21                    Asthma Triggers  How To Control Things That Make Your Asthma Worse    Triggers are things that make your asthma worse.  Look at the list below to help you find your triggers and   what you can do about them. You can help prevent asthma flare-ups by staying away from your triggers.      Trigger                                                          What you can do   Cigarette Smoke  Tobacco smoke can make asthma worse. Do not allow smoking in your home, car or around you.  Be sure no one smokes at a child s day care or school.  If you smoke, ask your health care provider for ways to help you quit.  Ask family members to quit too.  Ask your health care provider for a referral to Quit Plan to help you quit smoking, or call 5-356-887-PLAN.     Colds, Flu, Bronchitis  These are common triggers of asthma. Wash your hands often.  Don t touch your eyes, nose or mouth.  Get a flu shot every year.     Dust Mites  These are tiny bugs that live in cloth or carpet. They are too small to see. Wash sheets and blankets in hot water every week.   Encase pillows and mattress in dust mite proof covers.  Avoid having carpet if you can. If you have carpet, vacuum weekly.   Use a dust mask and HEPA vacuum.   Pollen and Outdoor Mold  Some people are allergic to trees, grass, or weed pollen, or molds. Try to keep your windows closed.  Limit time out doors when pollen count is high.   Ask you health care provider about taking medicine during allergy season.     Animal Dander  Some people are allergic to skin flakes, urine or saliva from pets with fur or  feathers. Keep pets with fur or feathers out of your home.    If you can t keep the pet outdoors, then keep the pet out of your bedroom.  Keep the bedroom door closed.  Keep pets off cloth furniture and away from stuffed toys.     Mice, Rats, and Cockroaches  Some people are allergic to the waste from these pests.   Cover food and garbage.  Clean up spills and food crumbs.  Store grease in the refrigerator.   Keep food out of the bedroom.   Indoor Mold  This can be a trigger if your home has high moisture. Fix leaking faucets, pipes, or other sources of water.   Clean moldy surfaces.  Dehumidify basement if it is damp and smelly.   Smoke, Strong Odors, and Sprays  These can reduce air quality. Stay away from strong odors and sprays, such as perfume, powder, hair spray, paints, smoke incense, paint, cleaning products, candles and new carpet.   Exercise or Sports  Some people with asthma have this trigger. Be active!  Ask your doctor about taking medicine before sports or exercise to prevent symptoms.    Warm up for 5-10 minutes before and after sports or exercise.     Other Triggers of Asthma  Cold air:  Cover your nose and mouth with a scarf.  Sometimes laughing or crying can be a trigger.  Some medicines and food can trigger asthma.

## 2021-08-11 ASSESSMENT — ASTHMA QUESTIONNAIRES: ACT_TOTALSCORE: 25

## 2021-10-10 ENCOUNTER — HEALTH MAINTENANCE LETTER (OUTPATIENT)
Age: 26
End: 2021-10-10

## 2022-09-24 ENCOUNTER — HEALTH MAINTENANCE LETTER (OUTPATIENT)
Age: 27
End: 2022-09-24

## 2023-10-08 ENCOUNTER — HEALTH MAINTENANCE LETTER (OUTPATIENT)
Age: 28
End: 2023-10-08